# Patient Record
Sex: MALE | Race: BLACK OR AFRICAN AMERICAN | Employment: FULL TIME | URBAN - METROPOLITAN AREA
[De-identification: names, ages, dates, MRNs, and addresses within clinical notes are randomized per-mention and may not be internally consistent; named-entity substitution may affect disease eponyms.]

---

## 2023-01-06 ENCOUNTER — HOSPITAL ENCOUNTER (EMERGENCY)
Age: 38
Discharge: HOME | End: 2023-01-07
Attending: EMERGENCY MEDICINE

## 2023-01-06 VITALS
DIASTOLIC BLOOD PRESSURE: 105 MMHG | TEMPERATURE: 97.7 F | OXYGEN SATURATION: 99 % | RESPIRATION RATE: 16 BRPM | HEART RATE: 79 BPM | WEIGHT: 220 LBS | BODY MASS INDEX: 31.5 KG/M2 | HEIGHT: 70 IN | SYSTOLIC BLOOD PRESSURE: 164 MMHG

## 2023-01-06 DIAGNOSIS — L03.011 PARONYCHIA OF FINGER OF RIGHT HAND: Primary | ICD-10-CM

## 2023-01-06 PROCEDURE — 99283 EMERGENCY DEPT VISIT LOW MDM: CPT

## 2023-01-06 PROCEDURE — 26011 DRAINAGE OF FINGER ABSCESS: CPT

## 2023-01-06 RX ORDER — CEPHALEXIN 500 MG/1
500 CAPSULE ORAL 4 TIMES DAILY
Qty: 28 CAPSULE | Refills: 0 | Status: SHIPPED | OUTPATIENT
Start: 2023-01-06

## 2023-01-06 RX ORDER — BUPIVACAINE HYDROCHLORIDE 5 MG/ML
30 INJECTION, SOLUTION EPIDURAL; INTRACAUDAL ONCE
Status: COMPLETED | OUTPATIENT
Start: 2023-01-06 | End: 2023-01-07

## 2023-01-06 RX ORDER — SULFAMETHOXAZOLE AND TRIMETHOPRIM 800; 160 MG/1; MG/1
1 TABLET ORAL 2 TIMES DAILY
Qty: 14 TABLET | Refills: 0 | Status: SHIPPED | OUTPATIENT
Start: 2023-01-06 | End: 2023-01-13

## 2023-01-06 RX ORDER — LIDOCAINE HYDROCHLORIDE AND EPINEPHRINE 10; 10 MG/ML; UG/ML
20 INJECTION, SOLUTION INFILTRATION; PERINEURAL ONCE
Status: COMPLETED | OUTPATIENT
Start: 2023-01-06 | End: 2023-01-07

## 2023-01-06 ASSESSMENT — ENCOUNTER SYMPTOMS
RHINORRHEA: 0
SORE THROAT: 0
VOMITING: 0
ABDOMINAL PAIN: 0
NAUSEA: 0
SHORTNESS OF BREATH: 0
DIARRHEA: 0
COUGH: 0
CONSTIPATION: 0

## 2023-01-06 ASSESSMENT — PAIN SCALES - GENERAL: PAINLEVEL_OUTOF10: 7

## 2023-01-06 ASSESSMENT — PAIN DESCRIPTION - ONSET: ONSET: GRADUAL

## 2023-01-06 ASSESSMENT — PAIN DESCRIPTION - DESCRIPTORS: DESCRIPTORS: DISCOMFORT

## 2023-01-06 ASSESSMENT — PAIN - FUNCTIONAL ASSESSMENT
PAIN_FUNCTIONAL_ASSESSMENT: ACTIVITIES ARE NOT PREVENTED
PAIN_FUNCTIONAL_ASSESSMENT: 0-10

## 2023-01-06 ASSESSMENT — PAIN DESCRIPTION - LOCATION: LOCATION: FINGER (COMMENT WHICH ONE)

## 2023-01-06 ASSESSMENT — PAIN DESCRIPTION - ORIENTATION: ORIENTATION: RIGHT

## 2023-01-06 ASSESSMENT — PAIN DESCRIPTION - PAIN TYPE: TYPE: ACUTE PAIN

## 2023-01-06 ASSESSMENT — PAIN DESCRIPTION - FREQUENCY: FREQUENCY: CONTINUOUS

## 2023-01-07 PROCEDURE — 2500000003 HC RX 250 WO HCPCS: Performed by: STUDENT IN AN ORGANIZED HEALTH CARE EDUCATION/TRAINING PROGRAM

## 2023-01-07 RX ADMIN — BUPIVACAINE HYDROCHLORIDE 150 MG: 5 INJECTION, SOLUTION EPIDURAL; INTRACAUDAL; PERINEURAL at 00:07

## 2023-01-07 RX ADMIN — LIDOCAINE HYDROCHLORIDE,EPINEPHRINE BITARTRATE 20 ML: 10; .01 INJECTION, SOLUTION INFILTRATION; PERINEURAL at 00:07

## 2023-01-07 NOTE — ED PROVIDER NOTES
[id number] RESIDENT NOTE       Date of evaluation: 1/6/2023    Chief Complaint     Finger Pain (Right pointer finger has swelling at cuticle. Patient reports he bites his nails and had a hang nail that he pulled off earlier in the week. He has had increased swelling and pain throughout the week. Denies drainage from the site. )      of Present Illness     Ingrid Devlin is an otherwise healthy 40 y.o. male who presents urgency department for right finger pain and swelling. Patient reports a 4-day history of symptoms which have been gradually worsening since onset. Finger pain and swelling is located at the distal aspect of the right pointer finger. He denies any trauma or injury. No systemic symptoms including fevers or chills. No history of similar symptoms. Review of Systems     Review of Systems   Constitutional:  Negative for chills and fever. HENT:  Negative for congestion, rhinorrhea and sore throat. Eyes:  Negative for visual disturbance. Respiratory:  Negative for cough and shortness of breath. Cardiovascular:  Negative for chest pain and leg swelling. Gastrointestinal:  Negative for abdominal pain, constipation, diarrhea, nausea and vomiting. Genitourinary:  Negative for dysuria and hematuria. Musculoskeletal:  Negative for arthralgias. Right pointer finger pain   Skin:  Negative for rash. Neurological:  Negative for syncope, weakness, light-headedness and headaches. Psychiatric/Behavioral:  Negative for agitation. All other systems reviewed and are negative. Past Medical, Surgical, Family, and Social History     He has no past medical history on file. He has no past surgical history on file. His family history is not on file. He reports that he has never smoked. He does not have any smokeless tobacco history on file. He reports current alcohol use. He reports that he does not use drugs.     Medications Discharge Medication List as of 1/7/2023 12:08 AM          Allergies     He has No Known Allergies. Physical Exam     INITIAL VITALS: BP: (!) 164/105, Temp: 97.7 °F (36.5 °C), Heart Rate: 79, Resp: 16, SpO2: 99 %   Physical Exam  Vitals reviewed. Constitutional:       General: He is not in acute distress. Appearance: He is normal weight. He is not toxic-appearing. HENT:      Head: Normocephalic and atraumatic. Nose: Nose normal.      Mouth/Throat:      Mouth: Mucous membranes are moist.      Pharynx: Oropharynx is clear. Eyes:      Extraocular Movements: Extraocular movements intact. Conjunctiva/sclera: Conjunctivae normal.      Pupils: Pupils are equal, round, and reactive to light. Cardiovascular:      Rate and Rhythm: Normal rate and regular rhythm. Pulmonary:      Effort: Pulmonary effort is normal. No respiratory distress. Abdominal:      General: Abdomen is flat. Palpations: Abdomen is soft. Musculoskeletal:         General: No swelling. Normal range of motion. Cervical back: Normal range of motion. Comments: Swelling with an area of fluctuance over the radial and posterior aspect of the right pointer finger consistent with paronychia. No diffuse finger swelling, no difficulty with range of motion of the PIP or DIP. Capillary fill less than 2 seconds. No signs of trauma or injury. Skin:     General: Skin is warm and dry. Capillary Refill: Capillary refill takes less than 2 seconds. Neurological:      General: No focal deficit present. Mental Status: He is alert and oriented to person, place, and time. Psychiatric:         Mood and Affect: Mood normal.         Behavior: Behavior normal.       DiagnosticResults     EKG   No EKG was obtained. RADIOLOGY:  No orders to display       LABS:   No results found for this visit on 01/06/23. ED BEDSIDE ULTRASOUND:  No results found.     RECENT VITALS:  BP: (!) 164/105, Temp: 97.7 °F (36.5 °C), Heart Rate: 79,Resp: 16, SpO2: 99 %     Procedures     Incision/Drainage    Date/Time: 1/6/2023 9:25 PM  Performed by: Lalito Gonzalez MD  Authorized by: Joseph Vasquez MD     Consent:     Consent obtained:  Verbal    Consent given by:  Patient    Risks, benefits, and alternatives were discussed: yes      Risks discussed:  Bleeding, incomplete drainage, infection and pain    Alternatives discussed:  No treatment  Universal protocol:     Patient identity confirmed:  Verbally with patient  Location:     Type:  Abscess    Location:  Upper extremity    Upper extremity location:  Finger    Finger location:  R index finger  Pre-procedure details:     Skin preparation:  Chlorhexidine  Sedation:     Sedation type:  None  Anesthesia:     Anesthesia method:  Nerve block    Block location:  R index finger digital block    Block needle gauge:  24 G    Block anesthetic:  Bupivacaine 0.5% WITH epi    Block outcome:  Anesthesia achieved  Procedure type:     Complexity:  Simple  Procedure details:     Incision types:  Stab incision    Incision depth:  Dermal    Wound management:  Probed and deloculated    Drainage:  Purulent    Drainage amount: Moderate    Wound treatment:  Wound left open    Packing materials:  None  Post-procedure details:     Procedure completion:  Tolerated well, no immediate complications      ED Course     Nursing Notes, Past Medical Hx, Past Surgical Hx, Social Hx, Allergies, and Family Hx were reviewed.          The patient was given the followingmedications:  Orders Placed This Encounter   Medications    lidocaine-EPINEPHrine 1 %-1:225642 injection 20 mL    bupivacaine (PF) (MARCAINE) 0.5 % injection 150 mg    sulfamethoxazole-trimethoprim (BACTRIM DS;SEPTRA DS) 800-160 MG per tablet     Sig: Take 1 tablet by mouth 2 times daily for 7 days     Dispense:  14 tablet     Refill:  0    cephALEXin (KEFLEX) 500 MG capsule     Sig: Take 1 capsule by mouth 4 times daily     Dispense:  28 capsule     Refill:  0 CONSULTS:  None    MEDICAL DECISION MAKING / ASSESSMENT / PLAN     Deanne Mojica is a 40 y.o. male who presents with 4-day history of right finger pain and swelling which is consistent with paronychia. Vital signs within normal limits and patient is afebrile. Physical exam as noted above. Patient happily denies any trauma or injury, making fracture, dislocation, and other etiologies much less likely. Plan made to proceed with incision and drainage which was performed as noted above. Patient tolerated the procedure well. Patient given prescription for antibiotics as an outpatient. Strict return instructions discussed. Patient discharged in stable condition. This patient was also evaluated by the attending physician. All care plans werediscussed and agreed upon. Clinical Impression     1. Paronychia of finger of right hand        Disposition     PATIENT REFERRED TO:  No follow-up provider specified.     DISCHARGE MEDICATIONS:  Discharge Medication List as of 1/7/2023 12:08 AM        START taking these medications    Details   sulfamethoxazole-trimethoprim (BACTRIM DS;SEPTRA DS) 800-160 MG per tablet Take 1 tablet by mouth 2 times daily for 7 days, Disp-14 tablet, R-0Print      cephALEXin (KEFLEX) 500 MG capsule Take 1 capsule by mouth 4 times daily, Disp-28 capsule, R-0Print             DISPOSITION Decision To Discharge 01/06/2023 11:38:12 PM       Brandi Canela MD  01/07/23 [id number]

## 2023-01-07 NOTE — DISCHARGE INSTRUCTIONS
You are seen in the emergency department for pain and swelling to your finger. You have an infection to the finger nailbed called a paronychia. I drained this abscess for you. I provided you with 2 prescriptions for antibiotics, Keflex and Bactrim. He should take these as prescribed. Use Tylenol ibuprofen as needed for pain and swelling. Follow-up with your primary care provider. Return with any new or worsening symptoms.

## 2023-01-07 NOTE — ED PROVIDER NOTES
ED Attending Attestation Note     Date of evaluation: 1/6/2023    This patient was seen by the resident. I have seen and examined the patient, agree with the workup, evaluation, management and diagnosis. The care plan has been discussed. My assessment reveals patient with a index finger paronychia. Patient had Marcaine and I&D kit ordered.   The night physician was present for digital block and paronychia I&D     Vahid Ulrich MD  01/06/23 [id number]